# Patient Record
Sex: FEMALE | Race: WHITE | NOT HISPANIC OR LATINO | URBAN - METROPOLITAN AREA
[De-identification: names, ages, dates, MRNs, and addresses within clinical notes are randomized per-mention and may not be internally consistent; named-entity substitution may affect disease eponyms.]

---

## 2017-11-10 ENCOUNTER — OUTPATIENT (OUTPATIENT)
Dept: OUTPATIENT SERVICES | Facility: HOSPITAL | Age: 62
LOS: 1 days | End: 2017-11-10
Payer: COMMERCIAL

## 2017-11-10 ENCOUNTER — APPOINTMENT (OUTPATIENT)
Dept: MAMMOGRAPHY | Facility: HOSPITAL | Age: 62
End: 2017-11-10
Payer: COMMERCIAL

## 2017-11-10 ENCOUNTER — APPOINTMENT (OUTPATIENT)
Dept: ULTRASOUND IMAGING | Facility: HOSPITAL | Age: 62
End: 2017-11-10
Payer: COMMERCIAL

## 2017-11-10 PROCEDURE — G0202: CPT | Mod: 26

## 2017-11-10 PROCEDURE — 77063 BREAST TOMOSYNTHESIS BI: CPT | Mod: 26

## 2017-11-10 PROCEDURE — 76641 ULTRASOUND BREAST COMPLETE: CPT | Mod: 26

## 2017-11-10 PROCEDURE — 77063 BREAST TOMOSYNTHESIS BI: CPT

## 2017-11-10 PROCEDURE — 77067 SCR MAMMO BI INCL CAD: CPT

## 2017-11-10 PROCEDURE — 76641 ULTRASOUND BREAST COMPLETE: CPT

## 2018-12-18 ENCOUNTER — APPOINTMENT (OUTPATIENT)
Dept: MAMMOGRAPHY | Facility: HOSPITAL | Age: 63
End: 2018-12-18

## 2018-12-18 ENCOUNTER — OUTPATIENT (OUTPATIENT)
Dept: OUTPATIENT SERVICES | Facility: HOSPITAL | Age: 63
LOS: 1 days | End: 2018-12-18
Payer: COMMERCIAL

## 2018-12-18 ENCOUNTER — APPOINTMENT (OUTPATIENT)
Dept: ULTRASOUND IMAGING | Facility: HOSPITAL | Age: 63
End: 2018-12-18

## 2018-12-18 DIAGNOSIS — R92.2 INCONCLUSIVE MAMMOGRAM: ICD-10-CM

## 2018-12-18 PROCEDURE — 77067 SCR MAMMO BI INCL CAD: CPT

## 2018-12-18 PROCEDURE — 76641 ULTRASOUND BREAST COMPLETE: CPT

## 2018-12-18 PROCEDURE — 77063 BREAST TOMOSYNTHESIS BI: CPT | Mod: 26

## 2018-12-18 PROCEDURE — 77063 BREAST TOMOSYNTHESIS BI: CPT

## 2018-12-18 PROCEDURE — 77067 SCR MAMMO BI INCL CAD: CPT | Mod: 26

## 2018-12-18 PROCEDURE — 76641 ULTRASOUND BREAST COMPLETE: CPT | Mod: 26

## 2019-11-29 ENCOUNTER — OUTPATIENT (OUTPATIENT)
Dept: OUTPATIENT SERVICES | Facility: HOSPITAL | Age: 64
LOS: 1 days | End: 2019-11-29
Payer: COMMERCIAL

## 2019-11-29 ENCOUNTER — APPOINTMENT (OUTPATIENT)
Dept: RADIOLOGY | Facility: HOSPITAL | Age: 64
End: 2019-11-29
Payer: COMMERCIAL

## 2019-11-29 DIAGNOSIS — R06.02 SHORTNESS OF BREATH: ICD-10-CM

## 2019-11-29 PROCEDURE — 71046 X-RAY EXAM CHEST 2 VIEWS: CPT

## 2019-11-29 PROCEDURE — 71046 X-RAY EXAM CHEST 2 VIEWS: CPT | Mod: 26

## 2019-12-02 ENCOUNTER — APPOINTMENT (OUTPATIENT)
Dept: RADIOLOGY | Facility: CLINIC | Age: 64
End: 2019-12-02
Payer: COMMERCIAL

## 2019-12-02 ENCOUNTER — OUTPATIENT (OUTPATIENT)
Dept: OUTPATIENT SERVICES | Facility: HOSPITAL | Age: 64
LOS: 1 days | End: 2019-12-02
Payer: COMMERCIAL

## 2019-12-02 DIAGNOSIS — Z00.8 ENCOUNTER FOR OTHER GENERAL EXAMINATION: ICD-10-CM

## 2019-12-02 PROCEDURE — 73630 X-RAY EXAM OF FOOT: CPT

## 2019-12-02 PROCEDURE — 73630 X-RAY EXAM OF FOOT: CPT | Mod: 26,LT

## 2019-12-30 ENCOUNTER — OUTPATIENT (OUTPATIENT)
Dept: OUTPATIENT SERVICES | Facility: HOSPITAL | Age: 64
LOS: 1 days | End: 2019-12-30
Payer: COMMERCIAL

## 2019-12-30 ENCOUNTER — APPOINTMENT (OUTPATIENT)
Dept: MAMMOGRAPHY | Facility: HOSPITAL | Age: 64
End: 2019-12-30
Payer: COMMERCIAL

## 2019-12-30 ENCOUNTER — APPOINTMENT (OUTPATIENT)
Dept: ULTRASOUND IMAGING | Facility: HOSPITAL | Age: 64
End: 2019-12-30
Payer: COMMERCIAL

## 2019-12-30 DIAGNOSIS — Z00.8 ENCOUNTER FOR OTHER GENERAL EXAMINATION: ICD-10-CM

## 2019-12-30 PROCEDURE — 76641 ULTRASOUND BREAST COMPLETE: CPT | Mod: 26,50

## 2019-12-30 PROCEDURE — 77067 SCR MAMMO BI INCL CAD: CPT | Mod: 26

## 2019-12-30 PROCEDURE — 77063 BREAST TOMOSYNTHESIS BI: CPT

## 2019-12-30 PROCEDURE — 77063 BREAST TOMOSYNTHESIS BI: CPT | Mod: 26

## 2019-12-30 PROCEDURE — 76641 ULTRASOUND BREAST COMPLETE: CPT

## 2019-12-30 PROCEDURE — 77067 SCR MAMMO BI INCL CAD: CPT

## 2021-03-31 ENCOUNTER — APPOINTMENT (OUTPATIENT)
Dept: MAMMOGRAPHY | Facility: HOSPITAL | Age: 66
End: 2021-03-31
Payer: COMMERCIAL

## 2021-03-31 ENCOUNTER — APPOINTMENT (OUTPATIENT)
Dept: ULTRASOUND IMAGING | Facility: HOSPITAL | Age: 66
End: 2021-03-31
Payer: COMMERCIAL

## 2021-03-31 ENCOUNTER — OUTPATIENT (OUTPATIENT)
Dept: OUTPATIENT SERVICES | Facility: HOSPITAL | Age: 66
LOS: 1 days | End: 2021-03-31
Payer: COMMERCIAL

## 2021-03-31 DIAGNOSIS — Z00.8 ENCOUNTER FOR OTHER GENERAL EXAMINATION: ICD-10-CM

## 2021-03-31 PROCEDURE — 76641 ULTRASOUND BREAST COMPLETE: CPT | Mod: 26,50

## 2021-03-31 PROCEDURE — 77067 SCR MAMMO BI INCL CAD: CPT | Mod: 26

## 2021-03-31 PROCEDURE — 77063 BREAST TOMOSYNTHESIS BI: CPT | Mod: 26

## 2021-03-31 PROCEDURE — 76641 ULTRASOUND BREAST COMPLETE: CPT

## 2021-03-31 PROCEDURE — 77067 SCR MAMMO BI INCL CAD: CPT

## 2021-03-31 PROCEDURE — 77063 BREAST TOMOSYNTHESIS BI: CPT

## 2021-04-17 ENCOUNTER — TRANSCRIPTION ENCOUNTER (OUTPATIENT)
Age: 66
End: 2021-04-17

## 2021-04-26 ENCOUNTER — APPOINTMENT (OUTPATIENT)
Dept: PEDIATRIC ALLERGY IMMUNOLOGY | Facility: CLINIC | Age: 66
End: 2021-04-26
Payer: COMMERCIAL

## 2021-04-26 DIAGNOSIS — L25.9 UNSPECIFIED CONTACT DERMATITIS, UNSPECIFIED CAUSE: ICD-10-CM

## 2021-04-26 PROCEDURE — 99203 OFFICE O/P NEW LOW 30 MIN: CPT | Mod: 95

## 2021-05-08 PROBLEM — L25.9 CONTACT DERMATITIS: Status: ACTIVE | Noted: 2021-05-08

## 2021-05-08 NOTE — HISTORY OF PRESENT ILLNESS
[Home] : at home, [unfilled] , at the time of the visit. [Other Location: e.g. Home (Enter Location, City,State)___] : at [unfilled] [Verbal consent obtained from patient] : the patient, [unfilled] [de-identified] : Stephy is a 66 year old woman with a rash who presents for initial allergy evaluaiton.\par \par She has had a rash for a few weeks. Initially got better while in prednisone 50mg but stopped this on 4x4 days. /21. Rash flared again overnight.  Prednisone helped with the rash but not with the itching. \par  \par New shampoo a month ago - dove shampoo with oxygen. Used this for almost a month.Last use was a few days ago. Switched to Camron and Camron baby shampoo - unscented. 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 No new foods. No recent travel. \par Seen by dermatology and was give a medrol dose pack.\par Pt felt very anxious. \par Rash is on face, back, AC fossae of elbow.\par Last dyed her hair week of March 28th. Shortly thereafter she tried a new shampoo.\par \par Takes Synthroid - has been on this for years.\par \par Hx of hypothyroidism.

## 2021-05-15 ENCOUNTER — TRANSCRIPTION ENCOUNTER (OUTPATIENT)
Age: 66
End: 2021-05-15

## 2021-05-17 ENCOUNTER — APPOINTMENT (OUTPATIENT)
Age: 66
End: 2021-05-17
Payer: COMMERCIAL

## 2021-05-17 VITALS
HEART RATE: 63 BPM | BODY MASS INDEX: 23.95 KG/M2 | OXYGEN SATURATION: 97 % | WEIGHT: 149 LBS | HEIGHT: 66 IN | SYSTOLIC BLOOD PRESSURE: 137 MMHG | DIASTOLIC BLOOD PRESSURE: 87 MMHG

## 2021-05-17 PROCEDURE — 99072 ADDL SUPL MATRL&STAF TM PHE: CPT

## 2021-05-17 PROCEDURE — 99203 OFFICE O/P NEW LOW 30 MIN: CPT

## 2021-05-17 NOTE — DATA REVIEWED
[de-identified] : 5/15/2021–right wrist x-rays: There is a linear fracture line over the radial styloid extending into the articular surface, nondisplaced.  No other fractures, dislocations, or osseous lesions.
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
## 2021-05-17 NOTE — PHYSICAL EXAM
[FreeTextEntry1] : General: well nourished, in no acute distress, alert and oriented to person, place and time.\par Psychiatric: normal mood and affect, no abnormal movements or speech patterns.\par Eyes: vision intact without deficits, sclera and conjunctiva were normal, pupils were equal in size. \par ENT: Ears and nose were normal in appearance. No thyromegaly.\par Lymph: no enlarged nodes, no lymphedema in extremity.\par Respiratory: Normal respiratory rhythm and effort. No wheezing detected without auscultation. No shortness of breath or respiratory distress.\par Cardiac: no cardiac related leg swelling.\par Neurology: normal gross sensation in extremities to light touch.\par Abdomen: soft, non-tender, tympanic, no masses.\par \par RUE:\par \par Skin CDI. +diffuse swelling, no ecchymosis.\par M/U/R/AIN/PIN 5/5. M/U/R/Ax SILT. +2 Rad pulse. Compartments soft. \par No palpable masses or lymphedema.\par +TTP over radial styloid. No snuff box TTP.\par Wrist ROM: +-20 w/ mild pain.\par

## 2021-05-17 NOTE — DISCUSSION/SUMMARY
[de-identified] : This is a 66-year-old female with a nondisplaced right radial styloid fracture.  I recommended a brief period of immobilization in a removable wrist brace for the next 3 weeks.  She may take it off for showering where she may do very gentle wrist range of motion exercises.  She is otherwise to be nonweightbearing.  Follow-up in 3 weeks for repeat x-rays at which point we may advance her weightbearing and splint precautions.  She may continue to take NSAIDs as needed.  All side effects were reviewed.

## 2021-05-31 ENCOUNTER — APPOINTMENT (OUTPATIENT)
Dept: RADIOLOGY | Facility: HOSPITAL | Age: 66
End: 2021-05-31
Payer: COMMERCIAL

## 2021-05-31 ENCOUNTER — OUTPATIENT (OUTPATIENT)
Dept: OUTPATIENT SERVICES | Facility: HOSPITAL | Age: 66
LOS: 1 days | End: 2021-05-31
Payer: COMMERCIAL

## 2021-05-31 DIAGNOSIS — Z00.8 ENCOUNTER FOR OTHER GENERAL EXAMINATION: ICD-10-CM

## 2021-05-31 PROCEDURE — 72040 X-RAY EXAM NECK SPINE 2-3 VW: CPT | Mod: 26

## 2021-05-31 PROCEDURE — 72040 X-RAY EXAM NECK SPINE 2-3 VW: CPT

## 2021-06-07 ENCOUNTER — APPOINTMENT (OUTPATIENT)
Dept: ORTHOPEDIC SURGERY | Facility: CLINIC | Age: 66
End: 2021-06-07
Payer: COMMERCIAL

## 2021-06-07 VITALS
DIASTOLIC BLOOD PRESSURE: 79 MMHG | WEIGHT: 150 LBS | SYSTOLIC BLOOD PRESSURE: 122 MMHG | BODY MASS INDEX: 24.11 KG/M2 | HEART RATE: 55 BPM | HEIGHT: 66 IN | OXYGEN SATURATION: 95 %

## 2021-06-07 DIAGNOSIS — S52.501A UNSPECIFIED FRACTURE OF THE LOWER END OF RIGHT RADIUS, INITIAL ENCOUNTER FOR CLOSED FRACTURE: ICD-10-CM

## 2021-06-07 PROCEDURE — 99072 ADDL SUPL MATRL&STAF TM PHE: CPT

## 2021-06-07 PROCEDURE — 99213 OFFICE O/P EST LOW 20 MIN: CPT

## 2021-06-07 PROCEDURE — 73110 X-RAY EXAM OF WRIST: CPT | Mod: RT

## 2021-06-07 NOTE — DISCUSSION/SUMMARY
[de-identified] : This is a 66-year-old female now 3 weeks status post right distal radius fracture, healing appropriately.  She no longer requires the wrist brace during the day and should start working on gentle range of motion exercises as tolerated.  10 pound weight restriction.  May continue to use the wrist brace at night.  Follow-up in 3 weeks at which point we may discontinue the brace and advance her weightbearing.
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
## 2021-06-07 NOTE — DATA REVIEWED
[de-identified] : 6/7/2021–right wrist x-rays (AP, lateral, oblique): There is a linear fracture line over the radial styloid extending into the articular surface, nondisplaced, now with interval callus formation. No other fractures, dislocations, or osseous lesions. \par \par

## 2021-06-07 NOTE — PHYSICAL EXAM
[FreeTextEntry1] : General: well nourished, in no acute distress, alert and oriented to person, place and time.\par Psychiatric: normal mood and affect, no abnormal movements or speech patterns.\par Eyes: vision intact without deficits, sclera and conjunctiva were normal, pupils were equal in size. \par ENT: Ears and nose were normal in appearance. No thyromegaly.\par Lymph: no enlarged nodes, no lymphedema in extremity.\par Respiratory: Normal respiratory rhythm and effort. No wheezing detected without auscultation. No shortness of breath or respiratory distress.\par Cardiac: no cardiac related leg swelling.\par Neurology: normal gross sensation in extremities to light touch.\par Abdomen: soft, non-tender, tympanic, no masses.\par \par RUE:\par \par Skin CDI. Resolved swelling. No ecchymosis.\par M/U/R/AIN/PIN 5/5. M/U/R/Ax SILT. +2 Rad pulse. Compartments soft. \par No palpable masses or lymphedema.\par Resolved TTP over radial styloid. No snuff box TTP.\par Wrist ROM: +-20 w/o pain.\par

## 2021-07-19 ENCOUNTER — APPOINTMENT (OUTPATIENT)
Dept: ORTHOPEDIC SURGERY | Facility: CLINIC | Age: 66
End: 2021-07-19

## 2021-10-28 ENCOUNTER — TRANSCRIPTION ENCOUNTER (OUTPATIENT)
Age: 66
End: 2021-10-28

## 2021-11-01 ENCOUNTER — TRANSCRIPTION ENCOUNTER (OUTPATIENT)
Age: 66
End: 2021-11-01

## 2021-11-05 ENCOUNTER — APPOINTMENT (OUTPATIENT)
Dept: FAMILY MEDICINE | Facility: CLINIC | Age: 66
End: 2021-11-05
Payer: COMMERCIAL

## 2021-11-05 ENCOUNTER — NON-APPOINTMENT (OUTPATIENT)
Age: 66
End: 2021-11-05

## 2021-11-05 VITALS
WEIGHT: 165 LBS | DIASTOLIC BLOOD PRESSURE: 82 MMHG | BODY MASS INDEX: 26.52 KG/M2 | OXYGEN SATURATION: 96 % | TEMPERATURE: 98 F | RESPIRATION RATE: 17 BRPM | HEIGHT: 66 IN | SYSTOLIC BLOOD PRESSURE: 132 MMHG | HEART RATE: 79 BPM

## 2021-11-05 DIAGNOSIS — Z78.9 OTHER SPECIFIED HEALTH STATUS: ICD-10-CM

## 2021-11-05 DIAGNOSIS — Z00.00 ENCOUNTER FOR GENERAL ADULT MEDICAL EXAMINATION W/OUT ABNORMAL FINDINGS: ICD-10-CM

## 2021-11-05 DIAGNOSIS — Z80.51 FAMILY HISTORY OF MALIGNANT NEOPLASM OF KIDNEY: ICD-10-CM

## 2021-11-05 DIAGNOSIS — Z80.3 FAMILY HISTORY OF MALIGNANT NEOPLASM OF BREAST: ICD-10-CM

## 2021-11-05 DIAGNOSIS — Z86.39 PERSONAL HISTORY OF OTHER ENDOCRINE, NUTRITIONAL AND METABOLIC DISEASE: ICD-10-CM

## 2021-11-05 DIAGNOSIS — Z82.5 FAMILY HISTORY OF ASTHMA AND OTHER CHRONIC LOWER RESPIRATORY DISEASES: ICD-10-CM

## 2021-11-05 DIAGNOSIS — R05.3 CHRONIC COUGH: ICD-10-CM

## 2021-11-05 DIAGNOSIS — Z82.49 FAMILY HISTORY OF ISCHEMIC HEART DISEASE AND OTHER DISEASES OF THE CIRCULATORY SYSTEM: ICD-10-CM

## 2021-11-05 DIAGNOSIS — H66.42 SUPPURATIVE OTITIS MEDIA, UNSPECIFIED, LEFT EAR: ICD-10-CM

## 2021-11-05 DIAGNOSIS — R09.81 NASAL CONGESTION: ICD-10-CM

## 2021-11-05 PROCEDURE — 99204 OFFICE O/P NEW MOD 45 MIN: CPT

## 2021-11-05 RX ORDER — DOXYCYCLINE HYCLATE 100 MG/1
100 CAPSULE ORAL
Qty: 20 | Refills: 0 | Status: ACTIVE | COMMUNITY
Start: 2021-11-05 | End: 1900-01-01

## 2021-11-05 RX ORDER — LEVOTHYROXINE SODIUM 75 UG/1
75 TABLET ORAL
Refills: 0 | Status: ACTIVE | COMMUNITY
Start: 2021-11-05

## 2021-11-05 RX ORDER — PROMETHAZINE HYDROCHLORIDE AND DEXTROMETHORPHAN HYDROBROMIDE ORAL SOLUTION 15; 6.25 MG/5ML; MG/5ML
6.25-15 SOLUTION ORAL
Qty: 1 | Refills: 1 | Status: ACTIVE | COMMUNITY
Start: 2021-11-05 | End: 1900-01-01

## 2021-11-05 NOTE — PHYSICAL EXAM
[No Acute Distress] : no acute distress [EOMI] : extraocular movements intact [Supple] : supple [Clear to Auscultation] : lungs were clear to auscultation bilaterally [Normal S1, S2] : normal S1 and S2 [Non Tender] : non-tender [No Rash] : no rash [Normal Gait] : normal gait [Normal Affect] : the affect was normal [de-identified] : left TM bulging, air/fluid level noted, erythematous, with purulence, pain upon evaluation, inflammed nasal turbintates with discharge, posterior oropharynx mildly erythematous

## 2021-11-05 NOTE — ASSESSMENT
[FreeTextEntry1] : Symptomatic management, plenty of rest, warm fluids, hand washing, salt water gargle, for now, Flonase, promethazine DM,  tylenol for fever, if symptoms worsen, new onset, fever, chills, return to office for re-evaluation, may consider getting COVID-19 PCR swab tested.\par \par Script for fasting comprehensive labwork provided\par advised to have it completed when symptomatically better\par and return for CPE visit.

## 2021-11-05 NOTE — REVIEW OF SYSTEMS
[Fatigue] : fatigue [Earache] : earache [Cough] : cough [Negative] : Heme/Lymph [FreeTextEntry4] : sinus congestion

## 2021-11-05 NOTE — HISTORY OF PRESENT ILLNESS
[Cold Symptoms] : cold symptoms [Earache (L)] : pain in left ear [Severe] : severe [___ Days ago] : [unfilled] days ago [Gradual] : gradually [Constant] : constant [Congestion] : congestion [Cough] : cough [Chills] : chills [Earache] : earache [Fatigue] : fatigue [Headache] : headache [Worsening] : worsening [Sore Throat] : no sore throat [Wheezing] : no wheezing [Anorexia] : no anorexia [Shortness Of Breath] : no shortness of breath [Fever] : no fever [FreeTextEntry2] : sinus pressure [FreeTextEntry4] : all day/all night [FreeTextEntry8] : Ms Stephy Severino is a 65 yo female presents today to establish care and pt has acute health concerns as listed below.

## 2021-11-08 ENCOUNTER — TRANSCRIPTION ENCOUNTER (OUTPATIENT)
Age: 66
End: 2021-11-08

## 2022-04-15 ENCOUNTER — APPOINTMENT (OUTPATIENT)
Dept: ULTRASOUND IMAGING | Facility: HOSPITAL | Age: 67
End: 2022-04-15
Payer: COMMERCIAL

## 2022-04-15 ENCOUNTER — OUTPATIENT (OUTPATIENT)
Dept: OUTPATIENT SERVICES | Facility: HOSPITAL | Age: 67
LOS: 1 days | End: 2022-04-15
Payer: COMMERCIAL

## 2022-04-15 ENCOUNTER — APPOINTMENT (OUTPATIENT)
Dept: MAMMOGRAPHY | Facility: HOSPITAL | Age: 67
End: 2022-04-15
Payer: COMMERCIAL

## 2022-04-15 DIAGNOSIS — Z00.8 ENCOUNTER FOR OTHER GENERAL EXAMINATION: ICD-10-CM

## 2022-04-15 PROCEDURE — 77067 SCR MAMMO BI INCL CAD: CPT

## 2022-04-15 PROCEDURE — 77063 BREAST TOMOSYNTHESIS BI: CPT | Mod: 26

## 2022-04-15 PROCEDURE — 77067 SCR MAMMO BI INCL CAD: CPT | Mod: 26

## 2022-04-15 PROCEDURE — 77063 BREAST TOMOSYNTHESIS BI: CPT

## 2022-04-16 ENCOUNTER — TRANSCRIPTION ENCOUNTER (OUTPATIENT)
Age: 67
End: 2022-04-16

## 2022-04-25 ENCOUNTER — APPOINTMENT (OUTPATIENT)
Dept: ULTRASOUND IMAGING | Facility: HOSPITAL | Age: 67
End: 2022-04-25
Payer: COMMERCIAL

## 2022-04-25 ENCOUNTER — OUTPATIENT (OUTPATIENT)
Dept: OUTPATIENT SERVICES | Facility: HOSPITAL | Age: 67
LOS: 1 days | End: 2022-04-25
Payer: COMMERCIAL

## 2022-04-25 DIAGNOSIS — R92.2 INCONCLUSIVE MAMMOGRAM: ICD-10-CM

## 2022-04-25 PROCEDURE — 76641 ULTRASOUND BREAST COMPLETE: CPT

## 2022-04-25 PROCEDURE — 76641 ULTRASOUND BREAST COMPLETE: CPT | Mod: 26,50

## 2022-06-21 ENCOUNTER — NON-APPOINTMENT (OUTPATIENT)
Age: 67
End: 2022-06-21

## 2022-08-24 ENCOUNTER — EMERGENCY (EMERGENCY)
Facility: HOSPITAL | Age: 67
LOS: 1 days | Discharge: HOME | End: 2022-08-24
Attending: EMERGENCY MEDICINE | Admitting: EMERGENCY MEDICINE
Payer: COMMERCIAL

## 2022-08-24 VITALS
HEIGHT: 66 IN | RESPIRATION RATE: 18 BRPM | OXYGEN SATURATION: 99 % | SYSTOLIC BLOOD PRESSURE: 128 MMHG | WEIGHT: 126.1 LBS | TEMPERATURE: 98 F | HEART RATE: 73 BPM | DIASTOLIC BLOOD PRESSURE: 88 MMHG

## 2022-08-24 DIAGNOSIS — Z98.890 OTHER SPECIFIED POSTPROCEDURAL STATES: Chronic | ICD-10-CM

## 2022-08-24 PROCEDURE — 73110 X-RAY EXAM OF WRIST: CPT | Mod: 26,LT

## 2022-08-24 PROCEDURE — 73110 X-RAY EXAM OF WRIST: CPT

## 2022-08-24 PROCEDURE — 99283 EMERGENCY DEPT VISIT LOW MDM: CPT

## 2022-08-24 RX ORDER — IBUPROFEN 200 MG
400 TABLET ORAL ONCE
Refills: 0 | Status: COMPLETED | OUTPATIENT
Start: 2022-08-24 | End: 2022-08-24

## 2022-08-24 RX ADMIN — Medication 400 MILLIGRAM(S): at 06:42

## 2022-08-24 RX ADMIN — Medication 400 MILLIGRAM(S): at 06:27

## 2022-08-24 NOTE — ED PROVIDER NOTE - CLINICAL SUMMARY MEDICAL DECISION MAKING FREE TEXT BOX
L wrist pain p fall. check xr r/o fx vs sprain.  mild abrasions and contusions other extremities, not fractured clinically. motrin for pain

## 2022-08-24 NOTE — ED PROVIDER NOTE - CARE PROVIDER_API CALL
Alexis Goldsmith)  Orthopaedic Surgery  [address] [place] [address]  Phone: [phone]  Fax: [phone]  Follow Up Time: 1-3 Days

## 2022-08-24 NOTE — ED ADULT NURSE NOTE - HAVE YOU HAD A FIRST COVID-19 BOOSTER?
As we discussed, your child did seem to have an anaphylactic reaction earlier.  He is doing quite well after we gave him the epinephrine, and the medication, and please be sure to give him Benadryl if he develops any new skin lesions, but use the EpiPen if he develops any facial swelling, or has any more vomiting.  Regardless, be absolutely certain to follow with your pediatrician in the next several days, specifically in the next 48 hours.  Come back to the ER immediately with any worsening symptoms, or if your child has any trouble breathing, coughing, or with any new swelling on his face at all.  Come back with any other concerns or questions.  
No
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
## 2022-08-24 NOTE — ED PROVIDER NOTE - NSFOLLOWUPINSTRUCTIONS_ED_ALL_ED_FT
- wear wrist splint  - take motrin for pain  - see orthopedist for follow up this week      Wrist Sprain    WHAT YOU NEED TO KNOW:    A wrist sprain happens when one or more ligaments in your wrist stretch or tear. Ligaments are tough tissues that connect bones and keep them in place, and support your joints.    DISCHARGE INSTRUCTIONS:    Return to the emergency department if:   •You have severe pain or swelling.      •Your injured wrist is red or has red streaks spreading from the injured area.      •You have new trouble moving your hands, fingers, or wrist.      •Your wrist, hand, or fingers feel cold or numb.      •Your fingernails turn blue or gray.      Call your doctor if:   •Your symptoms get worse.      •You have pain and swelling for more than 48 hours.      •You have questions or concerns about your condition or care.      Medicines: You may need any of the following:   •NSAIDs, such as ibuprofen, help decrease swelling, pain, and fever. NSAIDs can cause stomach bleeding or kidney problems in certain people. If you take blood thinner medicine, always ask your healthcare provider if NSAIDs are safe for you. Always read the medicine label and follow directions.      •Acetaminophen decreases pain and fever. It is available without a doctor's order. Ask how much to take and how often to take it. Follow directions. Read the labels of all other medicines you are using to see if they also contain acetaminophen, or ask your doctor or pharmacist. Acetaminophen can cause liver damage if not taken correctly.      •Take your medicine as directed. Contact your healthcare provider if you think your medicine is not helping or if you have side effects. Tell him or her if you are allergic to any medicine. Keep a list of the medicines, vitamins, and herbs you take. Include the amounts, and when and why you take them. Bring the list or the pill bottles to follow-up visits. Carry your medicine list with you in case of an emergency.      Self-care:   •Rest your wrist for at least 48 hours. Avoid activities that cause pain.      •Ice your wrist for 15 to 20 minutes every hour or as directed. Use an ice pack, or put crushed ice in a plastic bag. Cover it with a towel before you put it on your wrist. Ice helps prevent tissue damage and decreases swelling and pain.      •Compress your wrist with an elastic bandage. This will help decrease swelling, support your wrist, and help it heal. Wear your wrist wrap as directed. The elastic bandage should be snug but not tight.  How to Wrap an Elastic Bandage           •Elevate your wrist above the level of your heart as often as you can. This will help decrease swelling and pain. Prop your wrist on pillows or blankets to keep it elevated comfortably.             Wrist support: You may need to wear a splint or cast to support your wrist and prevent more damage. Wear your splint as directed. Ask for instructions on how to bathe while you are wearing a splint or cast.    Physical therapy: Your healthcare provider may recommend that you go to physical therapy. A physical therapist teaches you exercises to help improve movement and strength, and to decrease pain.    Follow up with your doctor as directed: Write down your questions so you remember to ask them during your visits.

## 2022-08-24 NOTE — ED PROVIDER NOTE - PATIENT PORTAL LINK FT
You can access the FollowMyHealth Patient Portal offered by [facility] by registering at the following website: [email or web address]. By joining [email or web address]’s FollowMyHealth portal, you will also be able to view your health information using other applications (apps) compatible with our system.

## 2022-08-24 NOTE — ED PROVIDER NOTE - MUSCULOSKELETAL, MLM
no c/t/L spine ttp. L wrist: no swelling. mild distal ulnar ttp. FROM . 2+ rad pulse. hand/figners nontender, FROM. L elbow/shoulder nontender, FROM. posterior elbow abrasion ~1x2cm.  L lateral knee abrasion 1cm oval. R knee medial contusion with mild ttp. no laxity, FROm s pain
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
## 2022-08-24 NOTE — ED ADULT NURSE NOTE - NSIMPLEMENTINTERV_GEN_ALL_ED
Implemented All Universal Safety Interventions:  [place] to call system. Call bell, personal items and telephone within reach. Instruct patient to call for assistance. Room bathroom lighting operational. Non-slip footwear when patient is off stretcher. Physically safe environment: no spills, clutter or unnecessary equipment. Stretcher in lowest position, wheels locked, appropriate side rails in place.

## 2022-08-28 NOTE — CHART NOTE - NSCHARTNOTEFT_GEN_A_CORE
SW assisted pt to discuss and assist with follow up care.  Pt is 68 y/o female presented to ED due to L wrist pain.  Pt reports that pt is feeling muc better and does not experience any symptoms at all.  Pt reports that she has no intention to schedule Ortho appt right now.  Pt encourage to call SW if further assistance is needed.

## 2023-04-03 PROBLEM — C50.919 MALIGNANT NEOPLASM OF UNSPECIFIED SITE OF UNSPECIFIED FEMALE BREAST: Chronic | Status: ACTIVE | Noted: 2022-08-24

## 2023-04-03 PROBLEM — Z86.39 PERSONAL HISTORY OF OTHER ENDOCRINE, NUTRITIONAL AND METABOLIC DISEASE: Chronic | Status: ACTIVE | Noted: 2022-08-24

## 2023-06-08 ENCOUNTER — OUTPATIENT (OUTPATIENT)
Dept: OUTPATIENT SERVICES | Facility: HOSPITAL | Age: 68
LOS: 1 days | End: 2023-06-08
Payer: MEDICARE

## 2023-06-08 ENCOUNTER — RESULT REVIEW (OUTPATIENT)
Age: 68
End: 2023-06-08

## 2023-06-08 ENCOUNTER — APPOINTMENT (OUTPATIENT)
Dept: MAMMOGRAPHY | Facility: HOSPITAL | Age: 68
End: 2023-06-08
Payer: MEDICARE

## 2023-06-08 ENCOUNTER — APPOINTMENT (OUTPATIENT)
Dept: ULTRASOUND IMAGING | Facility: HOSPITAL | Age: 68
End: 2023-06-08
Payer: MEDICARE

## 2023-06-08 DIAGNOSIS — Z00.8 ENCOUNTER FOR OTHER GENERAL EXAMINATION: ICD-10-CM

## 2023-06-08 DIAGNOSIS — R92.2 INCONCLUSIVE MAMMOGRAM: ICD-10-CM

## 2023-06-08 DIAGNOSIS — Z98.890 OTHER SPECIFIED POSTPROCEDURAL STATES: Chronic | ICD-10-CM

## 2023-06-08 PROCEDURE — 76641 ULTRASOUND BREAST COMPLETE: CPT | Mod: 26,50,GA

## 2023-06-08 PROCEDURE — 76641 ULTRASOUND BREAST COMPLETE: CPT

## 2023-06-08 PROCEDURE — 77063 BREAST TOMOSYNTHESIS BI: CPT | Mod: 26

## 2023-06-08 PROCEDURE — 77067 SCR MAMMO BI INCL CAD: CPT | Mod: 26

## 2023-06-08 PROCEDURE — 77063 BREAST TOMOSYNTHESIS BI: CPT

## 2023-06-08 PROCEDURE — 77067 SCR MAMMO BI INCL CAD: CPT

## 2023-09-13 ENCOUNTER — APPOINTMENT (OUTPATIENT)
Dept: OBGYN | Facility: CLINIC | Age: 68
End: 2023-09-13

## 2023-11-06 NOTE — ED ADULT NURSE NOTE - NSHOSCREENINGQ1_ED_ALL_ED
General Surgery Week 2 Survey      Flowsheet Row Responses   [facility] facility patient discharged from? [place]   Does the patient have one of the following disease processes/diagnoses(primary or secondary)? General Surgery   Week 2 attempt successful? No   Unsuccessful attempts Attempt 1            [facility] - Registered Nurse   No

## 2024-01-28 ENCOUNTER — NON-APPOINTMENT (OUTPATIENT)
Age: 69
End: 2024-01-28

## 2024-02-28 ENCOUNTER — RESULT REVIEW (OUTPATIENT)
Age: 69
End: 2024-02-28

## 2024-02-28 ENCOUNTER — OUTPATIENT (OUTPATIENT)
Dept: OUTPATIENT SERVICES | Facility: HOSPITAL | Age: 69
LOS: 1 days | End: 2024-02-28
Payer: MEDICARE

## 2024-02-28 ENCOUNTER — APPOINTMENT (OUTPATIENT)
Dept: ULTRASOUND IMAGING | Facility: CLINIC | Age: 69
End: 2024-02-28
Payer: MEDICARE

## 2024-02-28 DIAGNOSIS — Z98.890 OTHER SPECIFIED POSTPROCEDURAL STATES: Chronic | ICD-10-CM

## 2024-02-28 DIAGNOSIS — R59.9 ENLARGED LYMPH NODES, UNSPECIFIED: ICD-10-CM

## 2024-02-28 PROCEDURE — 76536 US EXAM OF HEAD AND NECK: CPT

## 2024-02-28 PROCEDURE — 76536 US EXAM OF HEAD AND NECK: CPT | Mod: 26

## 2024-05-01 NOTE — SOCIAL HISTORY
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

## 2024-06-14 ENCOUNTER — RESULT REVIEW (OUTPATIENT)
Age: 69
End: 2024-06-14

## 2024-06-14 ENCOUNTER — APPOINTMENT (OUTPATIENT)
Dept: MAMMOGRAPHY | Facility: HOSPITAL | Age: 69
End: 2024-06-14
Payer: MEDICARE

## 2024-06-14 ENCOUNTER — APPOINTMENT (OUTPATIENT)
Dept: ULTRASOUND IMAGING | Facility: HOSPITAL | Age: 69
End: 2024-06-14
Payer: MEDICARE

## 2024-06-14 ENCOUNTER — OUTPATIENT (OUTPATIENT)
Dept: OUTPATIENT SERVICES | Facility: HOSPITAL | Age: 69
LOS: 1 days | End: 2024-06-14
Payer: MEDICARE

## 2024-06-14 DIAGNOSIS — Z00.8 ENCOUNTER FOR OTHER GENERAL EXAMINATION: ICD-10-CM

## 2024-06-14 DIAGNOSIS — Z98.890 OTHER SPECIFIED POSTPROCEDURAL STATES: Chronic | ICD-10-CM

## 2024-06-14 PROCEDURE — 77063 BREAST TOMOSYNTHESIS BI: CPT | Mod: 26

## 2024-06-14 PROCEDURE — 76641 ULTRASOUND BREAST COMPLETE: CPT | Mod: 26,50,GY

## 2024-06-14 PROCEDURE — 77067 SCR MAMMO BI INCL CAD: CPT | Mod: 26

## 2024-06-14 PROCEDURE — 76641 ULTRASOUND BREAST COMPLETE: CPT

## 2024-06-14 PROCEDURE — 77067 SCR MAMMO BI INCL CAD: CPT

## 2024-06-14 PROCEDURE — 77063 BREAST TOMOSYNTHESIS BI: CPT

## 2024-09-09 ENCOUNTER — APPOINTMENT (OUTPATIENT)
Dept: OBGYN | Facility: CLINIC | Age: 69
End: 2024-09-09

## 2025-03-16 ENCOUNTER — NON-APPOINTMENT (OUTPATIENT)
Age: 70
End: 2025-03-16

## 2025-04-02 ENCOUNTER — NON-APPOINTMENT (OUTPATIENT)
Age: 70
End: 2025-04-02

## 2025-04-03 ENCOUNTER — APPOINTMENT (OUTPATIENT)
Dept: DERMATOLOGY | Facility: CLINIC | Age: 70
End: 2025-04-03

## 2025-04-03 DIAGNOSIS — L57.8 OTHER SKIN CHANGES DUE TO CHRONIC EXPOSURE TO NONIONIZING RADIATION: ICD-10-CM

## 2025-04-03 DIAGNOSIS — L57.0 ACTINIC KERATOSIS: ICD-10-CM

## 2025-04-03 DIAGNOSIS — L82.1 OTHER SEBORRHEIC KERATOSIS: ICD-10-CM

## 2025-04-03 DIAGNOSIS — D22.9 MELANOCYTIC NEVI, UNSPECIFIED: ICD-10-CM

## 2025-04-03 PROCEDURE — 99202 OFFICE O/P NEW SF 15 MIN: CPT | Mod: 25

## 2025-04-03 PROCEDURE — 17000 DESTRUCT PREMALG LESION: CPT

## 2025-06-06 ENCOUNTER — APPOINTMENT (OUTPATIENT)
Dept: MAMMOGRAPHY | Facility: HOSPITAL | Age: 70
End: 2025-06-06
Payer: MEDICARE

## 2025-06-06 ENCOUNTER — RESULT REVIEW (OUTPATIENT)
Age: 70
End: 2025-06-06

## 2025-06-06 ENCOUNTER — APPOINTMENT (OUTPATIENT)
Dept: ULTRASOUND IMAGING | Facility: HOSPITAL | Age: 70
End: 2025-06-06
Payer: MEDICARE

## 2025-06-06 ENCOUNTER — OUTPATIENT (OUTPATIENT)
Dept: OUTPATIENT SERVICES | Facility: HOSPITAL | Age: 70
LOS: 1 days | End: 2025-06-06
Payer: MEDICARE

## 2025-06-06 DIAGNOSIS — Z98.890 OTHER SPECIFIED POSTPROCEDURAL STATES: Chronic | ICD-10-CM

## 2025-06-06 DIAGNOSIS — Z00.8 ENCOUNTER FOR OTHER GENERAL EXAMINATION: ICD-10-CM

## 2025-06-06 PROCEDURE — 76641 ULTRASOUND BREAST COMPLETE: CPT | Mod: 26,50,GA

## 2025-06-06 PROCEDURE — 76641 ULTRASOUND BREAST COMPLETE: CPT

## 2025-06-06 PROCEDURE — 77063 BREAST TOMOSYNTHESIS BI: CPT | Mod: 26

## 2025-06-06 PROCEDURE — 77067 SCR MAMMO BI INCL CAD: CPT

## 2025-06-06 PROCEDURE — 77067 SCR MAMMO BI INCL CAD: CPT | Mod: 26

## 2025-06-06 PROCEDURE — 77063 BREAST TOMOSYNTHESIS BI: CPT
